# Patient Record
Sex: MALE | Race: WHITE
[De-identification: names, ages, dates, MRNs, and addresses within clinical notes are randomized per-mention and may not be internally consistent; named-entity substitution may affect disease eponyms.]

---

## 2017-01-24 ENCOUNTER — APPOINTMENT (OUTPATIENT)
Dept: CARDIOLOGY | Facility: CLINIC | Age: 82
End: 2017-01-24

## 2019-04-02 ENCOUNTER — APPOINTMENT (OUTPATIENT)
Dept: CARDIOLOGY | Facility: CLINIC | Age: 84
End: 2019-04-02
Payer: MEDICARE

## 2019-04-02 ENCOUNTER — NON-APPOINTMENT (OUTPATIENT)
Age: 84
End: 2019-04-02

## 2019-04-02 VITALS
HEIGHT: 72 IN | BODY MASS INDEX: 18.69 KG/M2 | WEIGHT: 138 LBS | HEART RATE: 65 BPM | DIASTOLIC BLOOD PRESSURE: 60 MMHG | SYSTOLIC BLOOD PRESSURE: 110 MMHG

## 2019-04-02 DIAGNOSIS — F02.80 ALZHEIMER'S DISEASE, UNSPECIFIED: ICD-10-CM

## 2019-04-02 DIAGNOSIS — Z87.898 PERSONAL HISTORY OF OTHER SPECIFIED CONDITIONS: ICD-10-CM

## 2019-04-02 DIAGNOSIS — I35.0 NONRHEUMATIC AORTIC (VALVE) STENOSIS: ICD-10-CM

## 2019-04-02 DIAGNOSIS — Z87.891 PERSONAL HISTORY OF NICOTINE DEPENDENCE: ICD-10-CM

## 2019-04-02 DIAGNOSIS — I48.91 UNSPECIFIED ATRIAL FIBRILLATION: ICD-10-CM

## 2019-04-02 DIAGNOSIS — G30.9 ALZHEIMER'S DISEASE, UNSPECIFIED: ICD-10-CM

## 2019-04-02 DIAGNOSIS — Z82.49 FAMILY HISTORY OF ISCHEMIC HEART DISEASE AND OTHER DISEASES OF THE CIRCULATORY SYSTEM: ICD-10-CM

## 2019-04-02 DIAGNOSIS — Z95.0 PRESENCE OF CARDIAC PACEMAKER: ICD-10-CM

## 2019-04-02 PROCEDURE — 99213 OFFICE O/P EST LOW 20 MIN: CPT

## 2019-04-02 PROCEDURE — 93000 ELECTROCARDIOGRAM COMPLETE: CPT

## 2019-04-02 RX ORDER — TRAZODONE HYDROCHLORIDE 100 MG/1
100 TABLET ORAL
Refills: 0 | Status: ACTIVE | COMMUNITY

## 2019-04-02 RX ORDER — TAMSULOSIN HYDROCHLORIDE 0.4 MG/1
0.4 CAPSULE ORAL
Qty: 90 | Refills: 3 | Status: ACTIVE | COMMUNITY

## 2019-04-02 RX ORDER — QUETIAPINE FUMARATE 25 MG/1
25 TABLET ORAL
Refills: 0 | Status: ACTIVE | COMMUNITY

## 2019-04-02 RX ORDER — DIVALPROEX SODIUM 125 MG/1
125 TABLET, DELAYED RELEASE ORAL
Refills: 0 | Status: ACTIVE | COMMUNITY

## 2019-04-02 RX ORDER — APIXABAN 2.5 MG/1
2.5 TABLET, FILM COATED ORAL
Refills: 0 | Status: ACTIVE | COMMUNITY

## 2019-04-02 NOTE — REASON FOR VISIT
[Consultation] : a consultation regarding [Aortic Stenosis] : aortic stenosis [Atrial Fibrillation] : atrial fibrillation [Pacemaker Evaluation] : pacemaker ~T evaluation ~C was performed

## 2019-04-06 PROBLEM — G30.9 ALZHEIMER DISEASE: Status: ACTIVE | Noted: 2019-04-02

## 2019-04-06 PROBLEM — I35.0 NON-RHEUMATIC AORTIC STENOSIS: Status: ACTIVE | Noted: 2019-04-02

## 2019-04-06 PROBLEM — Z95.0 CARDIAC PACEMAKER: Status: ACTIVE | Noted: 2019-04-02

## 2019-04-06 PROBLEM — I48.91 ATRIAL FIBRILLATION: Status: ACTIVE | Noted: 2019-04-02

## 2019-04-06 NOTE — PHYSICAL EXAM
[Normal Conjunctiva] : the conjunctiva exhibited no abnormalities [Eyelids - No Xanthelasma] : the eyelids demonstrated no xanthelasmas [Normal Oral Mucosa] : normal oral mucosa [No Oral Pallor] : no oral pallor [No Oral Cyanosis] : no oral cyanosis [Respiration, Rhythm And Depth] : normal respiratory rhythm and effort [Exaggerated Use Of Accessory Muscles For Inspiration] : no accessory muscle use [Auscultation Breath Sounds / Voice Sounds] : lungs were clear to auscultation bilaterally [Abdomen Soft] : soft [Abdomen Tenderness] : non-tender [] : no hepato-splenomegaly [Abdomen Mass (___ Cm)] : no abdominal mass palpated [FreeTextEntry1] : [employer] [employer]

## 2019-04-06 NOTE — HISTORY OF PRESENT ILLNESS
[FreeTextEntry1] : CHRISSIE ROSENTHAL  is a 88 year old  M\par \par History of AS s/p aortic valve replacement, chronic atrial fibrillation, permanent pacemaker\par History of advanced Alzheimer's, anemia, limited functional status,\par \par Seen today with his daughter\par Now full-time resident at [facility]\par He uses a wheelchair. There is a history of falls.\par Unable to provide any relevant hx\par Last seen in [facility] February 2019 \par Admitted  with a fall, femoral neck fracture \par \par EKG atrial fibrillation with intermittent ventricular pacing\par \par February 2019. Creatinine 0.7, hemoglobin 12.7,\par \par Recent echocardiogram demonstrates normal left ventricular function, biatrial enlargement, pulmonary hypertension, aortic valve gradient mean 18, moderate to severe mitral regurgitation, severe tricuspid regurgitation.\par

## 2019-04-06 NOTE — ASSESSMENT
[FreeTextEntry1] : Alzheimer's dementia\par VHD\par Chronic atrial fibrillation\par Permanent pacemaker\par \par No recent pacemaker check. Provided information of device for local [employer] interrogation.\par If unable to complete at [facility] then return to office. \par Overall, conservative measures given cognitive status and comorbidities. \par Anticoagulation adjust dose if either CR>1.5 or weight<60 kg\par Advanced directives noted\par SBE ppx prn \par \par